# Patient Record
Sex: MALE | Race: OTHER | Employment: OTHER | URBAN - METROPOLITAN AREA
[De-identification: names, ages, dates, MRNs, and addresses within clinical notes are randomized per-mention and may not be internally consistent; named-entity substitution may affect disease eponyms.]

---

## 2019-01-21 ENCOUNTER — IMPORTED ENCOUNTER (OUTPATIENT)
Dept: URBAN - METROPOLITAN AREA CLINIC 9 | Facility: CLINIC | Age: 79
End: 2019-01-21

## 2021-03-03 ENCOUNTER — IMPORTED ENCOUNTER (OUTPATIENT)
Dept: URBAN - METROPOLITAN AREA CLINIC 50 | Facility: CLINIC | Age: 81
End: 2021-03-03

## 2021-04-17 ASSESSMENT — VISUAL ACUITY
OS_OTHER: 20/25. 20/30.
OD_OTHER: 20/25. 20/30.
OD_CC: J1+@ 16 IN
OS_SC: 20/80
OS_CC: J1+@ 16 IN
OS_PH: @ 16 IN
OD_SC: 20/30
OD_PH: @ 16 IN
OD_BAT: 20/25
OS_BAT: 20/25

## 2021-04-17 ASSESSMENT — TONOMETRY
OD_IOP_MMHG: 18
OS_IOP_MMHG: 18

## 2021-10-18 ASSESSMENT — VISUAL ACUITY
OD_CC: 20/30 - SN
OD_CC: 20/20 -2 SN
OS_CC: 20/20 SN
OD_SC: 20/40 - SN
OS_SC: 20/60 - SN

## 2021-10-18 ASSESSMENT — TONOMETRY
OD_IOP_MMHG: 13
OS_IOP_MMHG: 12

## 2021-10-18 ASSESSMENT — KERATOMETRY
OS_K2POWER_DIOPTERS: 45.25
OD_AXISANGLE2_DEGREES: 7
OD_K1POWER_DIOPTERS: 44
OS_AXISANGLE2_DEGREES: 16
OD_K2POWER_DIOPTERS: 45.75
OS_AXISANGLE_DEGREES: 106
OS_K1POWER_DIOPTERS: 44.25
OD_AXISANGLE_DEGREES: 97

## 2022-02-25 ENCOUNTER — PREPPED CHART (OUTPATIENT)
Dept: URBAN - METROPOLITAN AREA CLINIC 53 | Facility: CLINIC | Age: 82
End: 2022-02-25

## 2022-03-01 ENCOUNTER — COMPREHENSIVE EXAM (OUTPATIENT)
Dept: URBAN - METROPOLITAN AREA CLINIC 53 | Facility: CLINIC | Age: 82
End: 2022-03-01

## 2022-03-01 DIAGNOSIS — H35.3131: ICD-10-CM

## 2022-03-01 DIAGNOSIS — H43.813: ICD-10-CM

## 2022-03-01 DIAGNOSIS — H26.493: ICD-10-CM

## 2022-03-01 PROCEDURE — 92015 DETERMINE REFRACTIVE STATE: CPT

## 2022-03-01 PROCEDURE — 92134 CPTRZ OPH DX IMG PST SGM RTA: CPT

## 2022-03-01 PROCEDURE — 92014 COMPRE OPH EXAM EST PT 1/>: CPT

## 2022-03-01 ASSESSMENT — VISUAL ACUITY
OS_SC: 20/100
OS_PH: 20/30+1
OD_CC: J1+-1
OU_SC: 20/40
OD_GLARE: 20/40
OD_SC: 20/40-1
OD_SC: J3-1
OS_GLARE: 20/25
OS_CC: 20/25
OS_GLARE: 20/30
OS_CC: J1+
OD_GLARE: 20/25
OU_CC: 20/20-2
OU_CC: J1+2
OS_SC: J2
OD_CC: 20/30-1
OU_SC: J2
OD_PH: 20/30

## 2022-03-01 ASSESSMENT — TONOMETRY
OD_IOP_MMHG: 15
OS_IOP_MMHG: 14

## 2023-01-27 NOTE — PATIENT DISCUSSION
1/27/23: Had detialed discussion about insertion and removal. Edu How to tell if lens is inside out. Discussed 'softness of lens'. Cleaning case and replacement of solution. Pt gets contact lenses online, instructed to call Lauren Leonardo to check pricing.

## 2024-03-12 ENCOUNTER — COMPREHENSIVE EXAM (OUTPATIENT)
Dept: URBAN - METROPOLITAN AREA CLINIC 53 | Facility: CLINIC | Age: 84
End: 2024-03-12

## 2024-03-12 DIAGNOSIS — H26.493: ICD-10-CM

## 2024-03-12 DIAGNOSIS — H35.033: ICD-10-CM

## 2024-03-12 DIAGNOSIS — H35.3131: ICD-10-CM

## 2024-03-12 DIAGNOSIS — H43.813: ICD-10-CM

## 2024-03-12 PROCEDURE — 92015 DETERMINE REFRACTIVE STATE: CPT

## 2024-03-12 PROCEDURE — 92134 CPTRZ OPH DX IMG PST SGM RTA: CPT

## 2024-03-12 PROCEDURE — 99214 OFFICE O/P EST MOD 30 MIN: CPT

## 2024-03-12 ASSESSMENT — VISUAL ACUITY
OD_CC: 20/25-2
OD_GLARE: 20/25
OS_GLARE: 20/25
OU_CC: J1+
OD_GLARE: 20/25
OS_CC: 20/20-1
OU_CC: 20/20
OS_GLARE: 20/25

## 2024-03-12 ASSESSMENT — TONOMETRY
OD_IOP_MMHG: 15
OS_IOP_MMHG: 15

## 2024-03-12 ASSESSMENT — KERATOMETRY
OD_K2POWER_DIOPTERS: 45.50
OD_AXISANGLE_DEGREES: 091
OD_AXISANGLE2_DEGREES: 1
OS_K2POWER_DIOPTERS: 45.25
OS_AXISANGLE2_DEGREES: 21
OS_AXISANGLE_DEGREES: 111
OS_K1POWER_DIOPTERS: 44.25
OD_K1POWER_DIOPTERS: 43.50

## 2025-03-25 ENCOUNTER — COMPREHENSIVE EXAM (OUTPATIENT)
Age: 85
End: 2025-03-25

## 2025-03-25 DIAGNOSIS — H35.3131: ICD-10-CM

## 2025-03-25 DIAGNOSIS — H52.4: ICD-10-CM

## 2025-03-25 DIAGNOSIS — D31.32: ICD-10-CM

## 2025-03-25 DIAGNOSIS — H04.123: ICD-10-CM

## 2025-03-25 DIAGNOSIS — H26.493: ICD-10-CM

## 2025-03-25 DIAGNOSIS — H43.813: ICD-10-CM

## 2025-03-25 DIAGNOSIS — H35.033: ICD-10-CM

## 2025-03-25 DIAGNOSIS — H11.31: ICD-10-CM

## 2025-03-25 PROCEDURE — 92134 CPTRZ OPH DX IMG PST SGM RTA: CPT

## 2025-03-25 PROCEDURE — 99214 OFFICE O/P EST MOD 30 MIN: CPT
